# Patient Record
Sex: MALE | Race: WHITE | NOT HISPANIC OR LATINO | Employment: FULL TIME | URBAN - METROPOLITAN AREA
[De-identification: names, ages, dates, MRNs, and addresses within clinical notes are randomized per-mention and may not be internally consistent; named-entity substitution may affect disease eponyms.]

---

## 2023-09-26 ENCOUNTER — HOSPITAL ENCOUNTER (EMERGENCY)
Facility: CLINIC | Age: 38
Discharge: HOME | End: 2023-09-26
Attending: FAMILY MEDICINE | Admitting: FAMILY MEDICINE

## 2023-09-26 ENCOUNTER — APPOINTMENT (OUTPATIENT)
Dept: GENERAL RADIOLOGY | Facility: CLINIC | Age: 38
End: 2023-09-26
Attending: FAMILY MEDICINE

## 2023-09-26 VITALS
SYSTOLIC BLOOD PRESSURE: 144 MMHG | HEIGHT: 66 IN | DIASTOLIC BLOOD PRESSURE: 79 MMHG | RESPIRATION RATE: 20 BRPM | HEART RATE: 62 BPM | BODY MASS INDEX: 43.39 KG/M2 | WEIGHT: 270 LBS | OXYGEN SATURATION: 97 % | TEMPERATURE: 97.8 F

## 2023-09-26 DIAGNOSIS — M79.671 PAIN OF RIGHT HEEL: ICD-10-CM

## 2023-09-26 PROCEDURE — 29515 APPLICATION SHORT LEG SPLINT: CPT | Mod: RT | Performed by: FAMILY MEDICINE

## 2023-09-26 PROCEDURE — 99284 EMERGENCY DEPT VISIT MOD MDM: CPT | Mod: 25 | Performed by: FAMILY MEDICINE

## 2023-09-26 PROCEDURE — 99283 EMERGENCY DEPT VISIT LOW MDM: CPT | Performed by: FAMILY MEDICINE

## 2023-09-26 PROCEDURE — 73650 X-RAY EXAM OF HEEL: CPT | Mod: RT

## 2023-09-26 ASSESSMENT — ACTIVITIES OF DAILY LIVING (ADL): ADLS_ACUITY_SCORE: 35

## 2023-09-26 NOTE — ED TRIAGE NOTES
Right heel pain for past couple weeks, worse when standing/pressure to area. No known injury      Triage Assessment       Row Name 09/26/23 0820       Triage Assessment (Adult)    Airway WDL WDL       Respiratory WDL    Respiratory WDL WDL       Skin Circulation/Temperature WDL    Skin Circulation/Temperature WDL WDL       Cardiac WDL    Cardiac WDL WDL       Peripheral/Neurovascular WDL    Peripheral Neurovascular WDL WDL       Cognitive/Neuro/Behavioral WDL    Cognitive/Neuro/Behavioral WDL WDL

## 2023-09-26 NOTE — LETTER
September 26, 2023      To Whom It May Concern:      Dex Gee was seen in our Emergency Department today, 09/26/23.  I expect his condition to improve over the next 7-14 days.  He may return to work during this time but he is going to have difficulty walking, pushing or pulling anything.    Sincerely,        Keith Hinds MD

## 2023-09-26 NOTE — ED PROVIDER NOTES
"  HPI   Patient is a 38-year-old male presenting with right heel pain.  Symptoms began about a week ago.  He points toward the very back of his heel at the insertion site of his Achilles tendon.  He has difficulty walking because of pain.  No obvious trauma or injury.  He woke up last week and describes getting out of bed and \"feeling something funny at the heel.\"  Pain has been worsened since onset.  No fever or other systemic symptoms.  No recent medication changes.      Allergies:  Allergies   Allergen Reactions    Pcn [Penicillins]      Problem List:    Patient Active Problem List    Diagnosis Date Noted    Hyperlipidemia LDL goal <160 12/28/2010     Priority: Medium      Past Medical History:    No past medical history on file.  Past Surgical History:    No past surgical history on file.  Family History:    Family History   Problem Relation Age of Onset    Depression Father     Thyroid Disease Sister      Social History:  Marital Status:   [2]  Social History     Tobacco Use    Smoking status: Passive Smoke Exposure - Never Smoker   Substance Use Topics    Alcohol use: No    Drug use: No      Medications:    NO ACTIVE MEDICATIONS      Review of Systems   All other systems reviewed and are negative.      PE   BP: (!) 144/79  Pulse: 62  Temp: 97.8  F (36.6  C)  Resp: 20  Height: 167.6 cm (5' 6\")  Weight: 122.5 kg (270 lb)  SpO2: 97 %  Physical Exam  Vitals and nursing note reviewed.   Constitutional:       General: He is not in acute distress.  HENT:      Head: Atraumatic.      Right Ear: External ear normal.      Left Ear: External ear normal.      Nose: Nose normal.      Mouth/Throat:      Mouth: Mucous membranes are moist.      Pharynx: Oropharynx is clear.   Eyes:      General: No scleral icterus.     Extraocular Movements: Extraocular movements intact.      Conjunctiva/sclera: Conjunctivae normal.      Pupils: Pupils are equal, round, and reactive to light.   Cardiovascular:      Rate and Rhythm: " Normal rate.   Pulmonary:      Effort: Pulmonary effort is normal. No respiratory distress.   Musculoskeletal:         General: Normal range of motion.      Cervical back: Normal range of motion.      Comments: Range of motion is intact though he will have right heel pain with full dorsiflexion.  When I squeeze the right calf there is no pain at the heel.  The Achilles seems full and intact but it does become swollen at the distal portion and at the insertion site.  There is point tenderness at the insertion site.  No overlying skin rash.  No evidence of open injury or wound.   Skin:     General: Skin is warm and dry.   Neurological:      Mental Status: He is alert and oriented to person, place, and time.   Psychiatric:         Behavior: Behavior normal.         ED COURSE and [facility]   0836.  Patient has symptoms and signs as described above.  X-ray of the heel pending.    1013.  Are unremarkable for acute pathology.  Calcification seen within the tendon.  When I squeeze the tendon does not elicit severe pain.  When I push on the distal insertion site there is severe pain in swelling.  Distal Achilles tendinopathy versus bursitis.  Walking boot and crutches provided.  Referral order for orthopedics provided.  Ibuprofen and Tylenol.    Electronic medical chart reviewed, including medical problems, medications, medical allergies, social history.  Recent hospitalizations and surgical procedures reviewed.  Recent clinic visits and consultations reviewed.  Recent labs and test results reviewed.  Nursing notes reviewed.    The patient, their parent if applicable, and/or their medical decision maker(s) and I have reviewed all of the available historical information, applicable PMH, physical exam findings, and objective diagnostic data gathered during this ED visit.  We then discussed all work-up options and then together agreed upon the course taken during this visit.  The ultimate disposition and plan was a cooperative  decision made between myself and the patient, their parent if applicable, and/or their legal decision maker(s).  The risks and benefits of all decisions made during this visit were discussed to the best of my abilities given the circumstances, and all parties are understanding of the pertinent ramifications of these decisions.      LABS  Labs Ordered and Resulted from Time of ED Arrival to Time of ED Departure - No data to display    IMAGING  Images reviewed by me.  Radiology report also reviewed.  XR Calcaneus Right G/E 2 Views   Final Result   IMPRESSION:      No evidence of acute fracture. Plantar and retrocalcaneal   enthesophytes.      GAIL SMITH MD            SYSTEM ID:  [id number]          Procedures    Medications - No data to display      IMPRESSION       ICD-10-CM    1. Pain of right heel  M79.671 Orthopedic  Referral     Ankle/Foot Bracing Supplies DME Walking Boot; Right; Non-pneumatic     Crutches Order    Bursitis versus distal Achilles tendinopathy.               Medication List      There are no discharge medications for this visit.                     Keith Hinds MD  09/26/23 1014

## 2023-09-26 NOTE — DISCHARGE INSTRUCTIONS
RETURN TO THE EMERGENCY ROOM FOR THE FOLLOWING:    Severely worsened pain, fever greater than 101, or at anytime for any concern.    FOLLOW UP:    Orthopedic/podiatry referral order placed at the time of discharge.  Expect a phone call within the next few days to help with scheduling.    TREATMENT RECOMMENDATIONS:    Walking boot for comfort during the daytime.  Crutches to be used if the walking boot is not helpful.  Use these only as needed until seen by orthopedics/podiatry.    NURSE ADVICE LINE:  [phone] or [phone]

## 2023-10-02 ENCOUNTER — OFFICE VISIT (OUTPATIENT)
Dept: PODIATRY | Facility: CLINIC | Age: 38
End: 2023-10-02
Attending: FAMILY MEDICINE

## 2023-10-02 VITALS
DIASTOLIC BLOOD PRESSURE: 89 MMHG | SYSTOLIC BLOOD PRESSURE: 139 MMHG | WEIGHT: 270 LBS | HEART RATE: 63 BPM | BODY MASS INDEX: 43.39 KG/M2 | HEIGHT: 66 IN

## 2023-10-02 DIAGNOSIS — M76.61 TENDONITIS, ACHILLES, RIGHT: ICD-10-CM

## 2023-10-02 DIAGNOSIS — M77.31 CALCANEAL SPUR, RIGHT: ICD-10-CM

## 2023-10-02 DIAGNOSIS — M10.071 ACUTE IDIOPATHIC GOUT OF RIGHT FOOT: Primary | ICD-10-CM

## 2023-10-02 DIAGNOSIS — M79.671 PAIN OF RIGHT HEEL: ICD-10-CM

## 2023-10-02 LAB — URATE SERPL-MCNC: 8.7 MG/DL (ref 3.4–7)

## 2023-10-02 PROCEDURE — 99203 OFFICE O/P NEW LOW 30 MIN: CPT | Performed by: PODIATRIST

## 2023-10-02 PROCEDURE — 84550 ASSAY OF BLOOD/URIC ACID: CPT | Performed by: PODIATRIST

## 2023-10-02 PROCEDURE — 36415 COLL VENOUS BLD VENIPUNCTURE: CPT | Performed by: PODIATRIST

## 2023-10-02 RX ORDER — MELOXICAM 7.5 MG/1
7.5 TABLET ORAL DAILY
Qty: 30 TABLET | Refills: 1 | Status: SHIPPED | OUTPATIENT
Start: 2023-10-02

## 2023-10-02 RX ORDER — METHYLPREDNISOLONE 4 MG
4 TABLET, DOSE PACK ORAL SEE ADMIN INSTRUCTIONS
Qty: 21 TABLET | Refills: 0 | Status: SHIPPED | OUTPATIENT
Start: 2023-10-02

## 2023-10-02 ASSESSMENT — PAIN SCALES - GENERAL: PAINLEVEL: SEVERE PAIN (6)

## 2023-10-02 NOTE — NURSING NOTE
"Chief Complaint   Patient presents with    Consult     Right heel pain and swelling       Initial /89   Pulse 63   Ht 1.676 m (5' 6\")   Wt 122.5 kg (270 lb)   BMI 43.58 kg/m   Estimated body mass index is 43.58 kg/m  as calculated from the following:    Height as of this encounter: 1.676 m (5' 6\").    Weight as of this encounter: 122.5 kg (270 lb).  Medications and allergies reviewed.      Geri KUMAR MA    "

## 2023-10-02 NOTE — Clinical Note
10/2/2023         RE: Dex Gee  [address] [facility] [address]        Dear Colleague,    Thank you for referring your patient, Dex Gee, to the [facility] [place]. Please see a copy of my visit note below.    PATIENT HISTORY:  Dex Gee is a 38 year old male who presents to clinic in consultation at the request of  Keith Hinds M.D. with a chief complaint of right heel pain and swelling.  The patient is seen by themselves.  The patient relates the pain is primarily located around the back of the heel and radiates into the top of the foot.  Reports insidious onset without acute precipitating event. that has been going on for 3 week(s).  The patient has previously tried Ibuprofen and Tylenol with little relief.  Denies any prior history of similar issues..  The patient is currently employed as  .  Any previous notes and studies that pertain to the patient's condition were reviewed.    Pertinent medical, surgical and family history was reviewed in the [facility] chart.    Past Medical History: No past medical history on file.    Medications:   Current Outpatient Medications:     NO ACTIVE MEDICATIONS, , Disp: , Rfl: 0     Allergies:    Allergies   Allergen Reactions    Pcn [Penicillins]        Vitals: There were no vitals taken for this visit.  BMI= There is no height or weight on file to calculate BMI.    LOWER EXTREMITY PHYSICAL EXAM    Dermatologic: Skin is intact to {RIGHT /LEFT:476164} lower extremity without significant lesions, rash or abrasion.        Vascular: DP & PT pulses are intact & regular on the {RIGHT /LEFT:186462}.   CFT and skin temperature is normal to the {RIGHT /LEFT:988174} lower extremity.     Neurologic: Lower extremity sensation is intact to light touch.  No evidence of weakness in the {RIGHT /LEFT:805749} lower extremity.        Musculoskeletal: Patient is ambulatory without assistive device or brace.   No gross ankle deformity noted.  No foot or ankle joint effusion is noted.  Noted pain on palpation on the plantar aspect of the {RIGHT /LEFT:541983} heel near the insertion point of the plantar fascia.  No surrounding erythema or edema noted.  Noted tight gastroc complex on the {RIGHT /LEFT:819879}.    Diagnostics:  Radiographs included three views of the {RIGHT LEFT BOTH NO:007507} foot demonstrating *** no cortical erosions or periosteal elevation.  All joint margins appear stable.  There is no apparent fracture or tumor formation noted.  There is no evidence of foreign body.  The images were independently reviewed by myself along with the patient explaining the findings.      ASSESSMENT / PLAN:   No diagnosis found.    I have explained to Dex about the conditions.  We discussed the underlying contributing factors to the condition as well as treatment options along with expected length of recovery.  At this time, the patient was educated on the importance of offloading supportive shoes and other devices.  I demonstrated to the patient calf stretches to perform every hour daily until symptoms resolve.  After symptoms resolve, the patient was advised to perform the stretches 3 times daily to prevent future recurrence.  The patient was instructed to perform warm soaks with Epson salt after which to also apply over-the-counter Voltaren gel to deeply massage the injured tissue.  The patient was instructed to do this on a daily basis until symptoms resolve.  The patient was fitted with a Dynaflex insert that will aid in offloading the tension forces to the soft tissues and prevent further inflammation.  To reduce the amount of current inflammation, the patient was prescribed Mobic 7.5 mg to be taken daily with food and instructed to stop taking if any stomach irritation or swelling in extremities are noted.  The patient may return in four weeks for reevaluation to determine if any further treatment will be  toni.    Dex verbalized agreement with and understanding of the rational for the diagnosis and treatment plan.  All questions were answered to best of my ability and the patient's satisfaction. The patient was advised to contact the clinic with any questions that may arise after the clinic visit.      Disclaimer: This note consists of symbols derived from keyboarding, dictation and/or voice recognition software. As a result, there may be errors in the script that have gone undetected. Please consider this when interpreting information found in this chart.       STACY Reyna.P.KASANDRA., F.A.C.F.A.S.        Again, thank you for allowing me to participate in the care of your patient.        Sincerely,        Toni Astorga DPM

## 2023-10-02 NOTE — PATIENT INSTRUCTIONS

## 2023-10-02 NOTE — PROGRESS NOTES
"PATIENT HISTORY:  Dex Gee is a 38 year old male who presents to clinic in consultation at the request of  Keith Hinds M.D. with a chief complaint of right heel pain and swelling.  The patient is seen by themselves.  The patient relates the pain is primarily located around the back of the heel and radiates into the top of the foot.  Reports insidious onset without acute precipitating event. that has been going on for 3 week(s).  The patient has previously tried Ibuprofen and Tylenol with little relief.  Denies any prior history of similar issues..  The patient is currently employed as  .  Any previous notes and studies that pertain to the patient's condition were reviewed.    Pertinent medical, surgical and family history was reviewed in the Epic chart.    Past Medical History: History reviewed. No pertinent past medical history.    Medications:   Current Outpatient Medications:     meloxicam (MOBIC) 7.5 MG tablet, Take 1 tablet (7.5 mg) by mouth daily, Disp: 30 tablet, Rfl: 1    methylPREDNISolone (MEDROL DOSEPAK) 4 MG tablet therapy pack, Take 1 tablet (4 mg) by mouth See Admin Instructions Tapered dose, Disp: 21 tablet, Rfl: 0    NO ACTIVE MEDICATIONS, , Disp: , Rfl: 0     Allergies:    Allergies   Allergen Reactions    Pcn [Penicillins]        Vitals: /89   Pulse 63   Ht 1.676 m (5' 6\")   Wt 122.5 kg (270 lb)   BMI 43.58 kg/m    BMI= Body mass index is 43.58 kg/m .    LOWER EXTREMITY PHYSICAL EXAM    Dermatologic: Skin is intact to right lower extremity without significant lesions, rash or abrasion.        Vascular: DP & PT pulses are intact & regular on the right.   CFT and skin temperature is normal to the right lower extremity.     Neurologic: Lower extremity sensation is intact to light touch.  No evidence of weakness in the right lower extremity.        Musculoskeletal: Patient is ambulatory without assistive device or brace.  No gross ankle deformity noted.  No " foot or ankle joint effusion is noted.  Noted pain on palpation on the posterior aspect of the right heel near the insertion point of the Achilles tendon.  No surrounding erythema or edema noted.  Noted tight gastroc complex on the right.    Diagnostics: Recent radiographs included calcaneal lateral and axial views of the right foot demonstrating posterior calcaneal spurring with no cortical erosions or periosteal elevation.  All joint margins appear stable.  There is no apparent fracture or tumor formation noted.  There is no evidence of foreign body.  The images were independently reviewed by myself along with the patient explaining the findings.      ASSESSMENT / PLAN:     ICD-10-CM    1. Acute idiopathic gout of right foot  M10.071 Uric acid     meloxicam (MOBIC) 7.5 MG tablet     methylPREDNISolone (MEDROL DOSEPAK) 4 MG tablet therapy pack     Uric acid      2. Pain of right heel  M79.671 Orthopedic  Referral    Bursitis versus distal Achilles tendinopathy.      3. Tendonitis, Achilles, right  M76.61 meloxicam (MOBIC) 7.5 MG tablet     methylPREDNISolone (MEDROL DOSEPAK) 4 MG tablet therapy pack      4. Calcaneal spur, right  M77.31 meloxicam (MOBIC) 7.5 MG tablet     methylPREDNISolone (MEDROL DOSEPAK) 4 MG tablet therapy pack          I have explained to Dex about the conditions.  We discussed the underlying contributing factors to the condition as well as treatment options along with expected length of recovery.  At this time, the patient was educated on the importance of offloading supportive shoes and other devices.  I demonstrated to the patient calf stretches to perform every hour daily until symptoms resolve.  After symptoms resolve, the patient was advised to perform the stretches 3 times daily to prevent future recurrence.  The patient was instructed to perform warm soaks with Epson salt after which to also apply over-the-counter Voltaren gel to deeply massage the injured tissue.  The  patient was instructed to do this on a daily basis until symptoms resolve.  The patient was ordered uric acid labs today.  To reduce the amount of current inflammation, the patient was prescribed a Medrol Dosepak followed by Mobic 7.5 mg to be taken daily with food and instructed to stop taking if any stomach irritation or swelling in extremities are noted.  The patient may return in four weeks for reevaluation to determine if any further treatment will be needed.    Dex verbalized agreement with and understanding of the rational for the diagnosis and treatment plan.  All questions were answered to best of my ability and the patient's satisfaction. The patient was advised to contact the clinic with any questions that may arise after the clinic visit.      Disclaimer: This note consists of symbols derived from keyboarding, dictation and/or voice recognition software. As a result, there may be errors in the script that have gone undetected. Please consider this when interpreting information found in this chart.       ALONZO Astorga D.P.M., F.DAVID.RIDDHI.F.A.S.

## 2024-02-23 ENCOUNTER — APPOINTMENT (OUTPATIENT)
Dept: CT IMAGING | Facility: CLINIC | Age: 39
End: 2024-02-23
Attending: EMERGENCY MEDICINE
Payer: COMMERCIAL

## 2024-02-23 ENCOUNTER — APPOINTMENT (OUTPATIENT)
Dept: MRI IMAGING | Facility: CLINIC | Age: 39
End: 2024-02-23
Attending: EMERGENCY MEDICINE
Payer: COMMERCIAL

## 2024-02-23 ENCOUNTER — HOSPITAL ENCOUNTER (EMERGENCY)
Facility: CLINIC | Age: 39
Discharge: HOME | End: 2024-02-23
Attending: EMERGENCY MEDICINE | Admitting: EMERGENCY MEDICINE
Payer: COMMERCIAL

## 2024-02-23 ENCOUNTER — APPOINTMENT (OUTPATIENT)
Dept: GENERAL RADIOLOGY | Facility: CLINIC | Age: 39
End: 2024-02-23
Attending: EMERGENCY MEDICINE
Payer: COMMERCIAL

## 2024-02-23 VITALS
WEIGHT: 265 LBS | BODY MASS INDEX: 42.59 KG/M2 | HEART RATE: 60 BPM | RESPIRATION RATE: 16 BRPM | TEMPERATURE: 98 F | SYSTOLIC BLOOD PRESSURE: 114 MMHG | DIASTOLIC BLOOD PRESSURE: 72 MMHG | OXYGEN SATURATION: 96 % | HEIGHT: 66 IN

## 2024-02-23 DIAGNOSIS — R42 DIZZINESS: ICD-10-CM

## 2024-02-23 DIAGNOSIS — R07.9 CHEST PAIN, UNSPECIFIED TYPE: ICD-10-CM

## 2024-02-23 LAB
ALBUMIN SERPL BCG-MCNC: 4.3 G/DL (ref 3.5–5.2)
ALP SERPL-CCNC: 85 U/L (ref 40–150)
ALT SERPL W P-5'-P-CCNC: 27 U/L (ref 0–70)
ANION GAP SERPL CALCULATED.3IONS-SCNC: 12 MMOL/L (ref 7–15)
APTT PPP: 30 SECONDS (ref 22–38)
AST SERPL W P-5'-P-CCNC: 21 U/L (ref 0–45)
BASOPHILS # BLD AUTO: 0.1 10E3/UL (ref 0–0.2)
BASOPHILS NFR BLD AUTO: 1 %
BILIRUB DIRECT SERPL-MCNC: <0.2 MG/DL (ref 0–0.3)
BILIRUB SERPL-MCNC: <0.2 MG/DL
BUN SERPL-MCNC: 16.8 MG/DL (ref 6–20)
CALCIUM SERPL-MCNC: 8.9 MG/DL (ref 8.6–10)
CHLORIDE SERPL-SCNC: 104 MMOL/L (ref 98–107)
CREAT SERPL-MCNC: 1.12 MG/DL (ref 0.67–1.17)
DEPRECATED HCO3 PLAS-SCNC: 24 MMOL/L (ref 22–29)
EGFRCR SERPLBLD CKD-EPI 2021: 86 ML/MIN/1.73M2
EOSINOPHIL # BLD AUTO: 0.1 10E3/UL (ref 0–0.7)
EOSINOPHIL NFR BLD AUTO: 2 %
ERYTHROCYTE [DISTWIDTH] IN BLOOD BY AUTOMATED COUNT: 12.4 % (ref 10–15)
GLUCOSE BLDC GLUCOMTR-MCNC: 83 MG/DL (ref 70–99)
GLUCOSE SERPL-MCNC: 88 MG/DL (ref 70–99)
HCT VFR BLD AUTO: 41.8 % (ref 40–53)
HGB BLD-MCNC: 14.5 G/DL (ref 13.3–17.7)
HOLD SPECIMEN: NORMAL
IMM GRANULOCYTES # BLD: 0 10E3/UL
IMM GRANULOCYTES NFR BLD: 0 %
INR PPP: 1.01 (ref 0.85–1.15)
LYMPHOCYTES # BLD AUTO: 2 10E3/UL (ref 0.8–5.3)
LYMPHOCYTES NFR BLD AUTO: 26 %
MCH RBC QN AUTO: 30 PG (ref 26.5–33)
MCHC RBC AUTO-ENTMCNC: 34.7 G/DL (ref 31.5–36.5)
MCV RBC AUTO: 86 FL (ref 78–100)
MONOCYTES # BLD AUTO: 0.6 10E3/UL (ref 0–1.3)
MONOCYTES NFR BLD AUTO: 8 %
NEUTROPHILS # BLD AUTO: 4.9 10E3/UL (ref 1.6–8.3)
NEUTROPHILS NFR BLD AUTO: 63 %
NRBC # BLD AUTO: 0 10E3/UL
NRBC BLD AUTO-RTO: 0 /100
NT-PROBNP SERPL-MCNC: 39 PG/ML (ref 0–450)
PLATELET # BLD AUTO: 217 10E3/UL (ref 150–450)
POTASSIUM SERPL-SCNC: 4.2 MMOL/L (ref 3.4–5.3)
PROT SERPL-MCNC: 7.4 G/DL (ref 6.4–8.3)
RBC # BLD AUTO: 4.84 10E6/UL (ref 4.4–5.9)
SODIUM SERPL-SCNC: 140 MMOL/L (ref 135–145)
TROPONIN T SERPL HS-MCNC: <6 NG/L
WBC # BLD AUTO: 7.6 10E3/UL (ref 4–11)

## 2024-02-23 PROCEDURE — 83880 ASSAY OF NATRIURETIC PEPTIDE: CPT | Performed by: EMERGENCY MEDICINE

## 2024-02-23 PROCEDURE — 250N000011 HC RX IP 250 OP 636: Performed by: EMERGENCY MEDICINE

## 2024-02-23 PROCEDURE — A9585 GADOBUTROL INJECTION: HCPCS | Performed by: EMERGENCY MEDICINE

## 2024-02-23 PROCEDURE — 99291 CRITICAL CARE FIRST HOUR: CPT | Mod: 25 | Performed by: EMERGENCY MEDICINE

## 2024-02-23 PROCEDURE — 85610 PROTHROMBIN TIME: CPT | Performed by: EMERGENCY MEDICINE

## 2024-02-23 PROCEDURE — 71046 X-RAY EXAM CHEST 2 VIEWS: CPT

## 2024-02-23 PROCEDURE — 70553 MRI BRAIN STEM W/O & W/DYE: CPT

## 2024-02-23 PROCEDURE — 85730 THROMBOPLASTIN TIME PARTIAL: CPT | Performed by: EMERGENCY MEDICINE

## 2024-02-23 PROCEDURE — 70450 CT HEAD/BRAIN W/O DYE: CPT

## 2024-02-23 PROCEDURE — 82962 GLUCOSE BLOOD TEST: CPT

## 2024-02-23 PROCEDURE — 93005 ELECTROCARDIOGRAM TRACING: CPT | Performed by: EMERGENCY MEDICINE

## 2024-02-23 PROCEDURE — 255N000002 HC RX 255 OP 636: Performed by: EMERGENCY MEDICINE

## 2024-02-23 PROCEDURE — 250N000009 HC RX 250: Performed by: EMERGENCY MEDICINE

## 2024-02-23 PROCEDURE — 70496 CT ANGIOGRAPHY HEAD: CPT

## 2024-02-23 PROCEDURE — 93010 ELECTROCARDIOGRAM REPORT: CPT | Performed by: EMERGENCY MEDICINE

## 2024-02-23 PROCEDURE — 36415 COLL VENOUS BLD VENIPUNCTURE: CPT | Performed by: EMERGENCY MEDICINE

## 2024-02-23 PROCEDURE — 96375 TX/PRO/DX INJ NEW DRUG ADDON: CPT | Performed by: EMERGENCY MEDICINE

## 2024-02-23 PROCEDURE — 0042T CT HEAD PERFUSION W CONTRAST: CPT

## 2024-02-23 PROCEDURE — 96374 THER/PROPH/DIAG INJ IV PUSH: CPT | Mod: 59 | Performed by: EMERGENCY MEDICINE

## 2024-02-23 PROCEDURE — 85025 COMPLETE CBC W/AUTO DIFF WBC: CPT | Performed by: EMERGENCY MEDICINE

## 2024-02-23 PROCEDURE — 84484 ASSAY OF TROPONIN QUANT: CPT | Performed by: EMERGENCY MEDICINE

## 2024-02-23 PROCEDURE — 80048 BASIC METABOLIC PNL TOTAL CA: CPT | Performed by: EMERGENCY MEDICINE

## 2024-02-23 PROCEDURE — 80076 HEPATIC FUNCTION PANEL: CPT | Performed by: EMERGENCY MEDICINE

## 2024-02-23 RX ORDER — IOPAMIDOL 755 MG/ML
167 INJECTION, SOLUTION INTRAVASCULAR ONCE
Status: COMPLETED | OUTPATIENT
Start: 2024-02-23 | End: 2024-02-23

## 2024-02-23 RX ORDER — GADOBUTROL 604.72 MG/ML
12 INJECTION INTRAVENOUS ONCE
Status: COMPLETED | OUTPATIENT
Start: 2024-02-23 | End: 2024-02-23

## 2024-02-23 RX ORDER — ONDANSETRON 2 MG/ML
4 INJECTION INTRAMUSCULAR; INTRAVENOUS ONCE
Status: COMPLETED | OUTPATIENT
Start: 2024-02-23 | End: 2024-02-23

## 2024-02-23 RX ORDER — LORAZEPAM 2 MG/ML
0.5 INJECTION INTRAMUSCULAR ONCE
Status: COMPLETED | OUTPATIENT
Start: 2024-02-23 | End: 2024-02-23

## 2024-02-23 RX ADMIN — GADOBUTROL 12 ML: 604.72 INJECTION INTRAVENOUS at 18:15

## 2024-02-23 RX ADMIN — SODIUM CHLORIDE 200 ML: 9 INJECTION, SOLUTION INTRAVENOUS at 17:01

## 2024-02-23 RX ADMIN — LORAZEPAM 0.5 MG: 2 INJECTION INTRAMUSCULAR; INTRAVENOUS at 18:06

## 2024-02-23 RX ADMIN — ONDANSETRON 4 MG: 2 INJECTION INTRAMUSCULAR; INTRAVENOUS at 17:26

## 2024-02-23 RX ADMIN — IOPAMIDOL 167 ML: 755 INJECTION, SOLUTION INTRAVENOUS at 17:01

## 2024-02-23 ASSESSMENT — ACTIVITIES OF DAILY LIVING (ADL)
ADLS_ACUITY_SCORE: 35

## 2024-02-23 ASSESSMENT — COLUMBIA-SUICIDE SEVERITY RATING SCALE - C-SSRS
2. HAVE YOU ACTUALLY HAD ANY THOUGHTS OF KILLING YOURSELF IN THE PAST MONTH?: NO
1. IN THE PAST MONTH, HAVE YOU WISHED YOU WERE DEAD OR WISHED YOU COULD GO TO SLEEP AND NOT WAKE UP?: NO
6. HAVE YOU EVER DONE ANYTHING, STARTED TO DO ANYTHING, OR PREPARED TO DO ANYTHING TO END YOUR LIFE?: NO

## 2024-02-23 ASSESSMENT — VISUAL ACUITY
OU: BASELINE
OU: BASELINE

## 2024-02-23 NOTE — ED NOTES
38-year-old male presents urgent care initially with concern over dizzy, disoriented, difficulty word finding and short of breath developed earlier this afternoon.  I discussed with patient typical scope evaluation available in the urgent care and given broad differential for symptoms recommend evaluation in the emergency department.  Patient was amenable to this plan.  He was transferred to the ED triage nurse.       Magui Corbett PA-C  02/23/24 0584

## 2024-02-23 NOTE — CONSULTS
[facility]    Stroke Telephone Note    I was called by Abilio Flores on 02/23/24 regarding patient Dex Gee. The patient is a 38 year old male HLD who presents to the ED after he had 2 episodes of dizziness and imbalance or difficulty with getting the words out and feeling confused.  Symptoms started at 10 AM when he had dizziness and imbalance.  His first episode lasted 15 minutes and the second episode lasted 45 minutes around noon time.  Is currently in the ED and states that he feels off now.  No objective neuro exam deficits as per ED.      Vitals  BP: (!) 141/90   Pulse: 53   Resp: 17       Weight: 120.2 kg (265 lb)    Stroke Code Data (for stroke code without tele)  Stroke code activated 02/23/24  1651   Stroke provider first response 02/23/24  1653   Last known normal 02/23/24  1000      Time of discovery (or onset of symptoms) 02/23/24  1000   Head CT read by Stroke Neuro Provider 02/23/24  1714   Was stroke code de-escalated? Yes  02/23/24  1730     Imaging Findings  CT head: No hemorrhage  CTA head/neck: No LVO    Intravenous Thrombolysis  Not given due to:   - no hemorrhage    Endovascular Treatment  Not initiated due to absence of proximal vessel occlusion    Impression  Patient had 2 episodes of dizziness, imbalance and possible confusion and his imaging is not showing signs of ischemia or hemorrhage.  Less likely to be acute ischemic infarct    Recommendations   -MRI brain with and without contrast.  Please page on-call stroke neurology of once the MRI is obtained.    My recommendations are based on the information provided over the phone by Dex Gee's in-person providers. They are not intended to replace the clinical judgment of his in-person providers. I was not requested to personally see or examine the patient at this time.     The Stroke Staff is Dr. Hi.    Klarissa Tomas MD  Vascular Neurology Fellow    To page me or covering stroke  "neurology team member, click here: [facility]  Choose \"On Call\" tab at top, then select \"NEUROLOGY/ALL SITES\" from middle drop-down box, press Enter, then look for \"stroke\" or \"telestroke\" for your site.   "

## 2024-02-23 NOTE — ED TRIAGE NOTES
"Patient reports ~ 1000 this morning while at work patient started to developed chest pain, dyspnea and dizziness. Patient attempted to rest to alleviate symptoms. No change. Reports \"disorientation\". Could not remember tasks he was doing. No cardiac hx or stroke hx.      Triage Assessment (Adult)       Row Name 02/23/24 5853          Triage Assessment    Airway WDL WDL        Respiratory WDL    Respiratory WDL X;rhythm/pattern     Rhythm/Pattern, Respiratory shortness of breath        Skin Circulation/Temperature WDL    Skin Circulation/Temperature WDL WDL        Cardiac WDL    Cardiac WDL X;chest pain;rhythm     Pulse Rate & Regularity bradycardic     Cardiac Rhythm NSR        Chest Pain Assessment    Chest Pain Location anterior chest, left     Chest Pain Radiation back     Character pressure     Duration ~1000     Precipitating Factors nothing     Alleviating Factors rest     Associated Signs/Symptoms dizziness;dyspnea     Chest Pain Intervention 12-lead ECG obtained;cardiac biomarkers drawn;cardiac monitoring continued;cardiac monitor placed        Peripheral/Neurovascular WDL    Peripheral Neurovascular WDL WDL        Cognitive/Neuro/Behavioral WDL    Cognitive/Neuro/Behavioral WDL X     Level of Consciousness alert     Arousal Level opens eyes spontaneously     Orientation oriented x 4     Speech clear;spontaneous;logical     Mood/Behavior calm;cooperative        Pupils (CN II)    Pupil PERRLA yes     Pupil Size Left 5 mm     Pupil Size Right 5 mm        [place] Coma Scale    Best Eye Response 4-->(E4) spontaneous     Best Motor Response 6-->(M6) obeys commands     Best Verbal Response 5-->(V5) oriented     [place] Coma Scale Score 15                     "

## 2024-02-23 NOTE — ED PROVIDER NOTES
History     Chief Complaint   Patient presents with    Chest Pain    Dizziness     HPI  Dex Gee is a 38 year old male with no significant past medical history other than hyperlipidemia who presents emergency department complaining of dizziness feeling off and and having trouble concentrating difficulty with reading text and felt like he was having trouble finding words.  This lasted about 15 minutes he was feeling better but reoccurred around noon with similar symptoms.  States this lasted about 45 minutes and shortly after this when he went home had some chest pressure and felt a bit short of breath.  States he had difficulty lying down.  Family decided bring him in for further evaluation and care denies any headache at this time has not had any visual changes is having some mild chest pressure denies any focal numbness weakness in extremity has not any bowel or bladder dysfunction.  Denies any leg swelling or calf pain.    Allergies:  Allergies   Allergen Reactions    Shellfish Allergy Itching and Shortness Of Breath    Pcn [Penicillins]     Sulfa Antibiotics Unknown     Patient reported on 8-18-18  his throat swells up when fireworks are being used. He doesn't recall being allergic to sulfa antibiotics, just sulfur from fireworks.       Problem List:    Patient Active Problem List    Diagnosis Date Noted    Hyperlipidemia LDL goal <160 12/28/2010     Priority: Medium        Past Medical History:    No past medical history on file.    Past Surgical History:    No past surgical history on file.    Family History:    Family History   Problem Relation Age of Onset    Depression Father     Thyroid Disease Sister        Social History:  Marital Status:   [2]  Social History     Tobacco Use    Smoking status: Never     Passive exposure: Yes    Smokeless tobacco: Current     Types: Chew   Substance Use Topics    Alcohol use: No    Drug use: No        Medications:    meloxicam (MOBIC) 7.5 MG  "tablet  methylPREDNISolone (MEDROL DOSEPAK) 4 MG tablet therapy pack  NO ACTIVE MEDICATIONS          Review of Systems  As per HPI.  Physical Exam   BP: (!) 129/92  Pulse: 65  Resp: 17  Height: 167.6 cm (5' 6\")  Weight: 120.2 kg (265 lb)  SpO2: 99 %      Physical Exam  Vitals and nursing note reviewed.   Constitutional:       Appearance: Normal appearance.      Comments: Mild generalized distress   HENT:      Head: Normocephalic and atraumatic.      Nose: Nose normal.      Mouth/Throat:      Mouth: Mucous membranes are moist.      Pharynx: Oropharynx is clear.   Eyes:      Extraocular Movements: Extraocular movements intact.      Conjunctiva/sclera: Conjunctivae normal.      Pupils: Pupils are equal, round, and reactive to light.      Comments: No visual field deficits are noted.   Cardiovascular:      Rate and Rhythm: Normal rate and regular rhythm.      Pulses: Normal pulses.      Heart sounds: Normal heart sounds. No murmur heard.  Pulmonary:      Effort: Pulmonary effort is normal.      Breath sounds: Normal breath sounds. No stridor. No wheezing or rhonchi.   Abdominal:      General: Abdomen is flat. Bowel sounds are normal. There is no distension.      Palpations: Abdomen is soft.      Tenderness: There is no abdominal tenderness. There is no right CVA tenderness or left CVA tenderness.   Musculoskeletal:         General: Normal range of motion.      Cervical back: Normal range of motion and neck supple.      Right lower leg: No edema.      Left lower leg: No edema.   Skin:     General: Skin is warm and dry.      Capillary Refill: Capillary refill takes less than 2 seconds.      Findings: No rash.   Neurological:      General: No focal deficit present.      Mental Status: He is alert and oriented to person, place, and time.      Cranial Nerves: No cranial nerve deficit.      Sensory: No sensory deficit.      Motor: No weakness.      Coordination: Coordination normal.      Comments: There is no drift in upper " or lower extremities.   Psychiatric:         Mood and Affect: Mood normal.       ED Course                Procedures              EKG Interpretation:      Interpreted by Abilio Flores MD  Rhythm: sinus bradycardia  Rate: 50-60  Axis: Normal  Ectopy: none  Conduction: normal  ST Segments/ T Waves: Non-specific ST-T wave changes  Q Waves: none  Comparison to prior: No old EKG available    Clinical Impression: sinus bradycardia            Critical Care time:  was 30 minutes for this patient excluding procedures.  For management of tier 2 stroke code.     The patient has stroke symptoms:         ED Stroke specific documentation           NIHSS PDF     Patient last known well time: 10a  ED Provider first to bedside at: Upon arrival  CT Results received at: 535p    Thrombolytics:   Not given due to:   - minor/isolated/quickly resolving symptoms    If treating with thrombolytics: Ensure SBP<180 and DBP<105 prior to treatment with thrombolytics.  Administering thrombolytics after treatment with IV labetalol, hydralazine, or nicardipine is reasonable once BP control is established.    Endovascular Retrieval:  Not initiated due to absence of proximal vessel occlusion    National Institutes of Health Stroke Scale (Baseline)  Time Performed:      Score    Level of consciousness: (0)   Alert, keenly responsive    LOC questions: (0)   Answers both questions correctly    LOC commands: (0)   Performs both tasks correctly    Best gaze: (0)   Normal    Visual: (0)   No visual loss    Facial palsy: (0)   Normal symmetrical movements    Motor arm (left): (0)   No drift    Motor arm (right): (0)   No drift    Motor leg (left): (0)   No drift    Motor leg (right): (0)   No drift    Limb ataxia: (0)   Absent    Sensory: (0)   Normal- no sensory loss    Best language: (0)   Normal- no aphasia    Dysarthria: (0)   Normal    Extinction and inattention: (0)   No abnormality        Total Score:  0        Stroke Mimics were considered  (including migraine headache, seizure disorder, hypoglycemia (or hyperglycemia), head or spinal trauma, CNS infection, Toxin ingestion and shock state (e.g. sepsis) .            National Institutes of Health Stroke Scale  Time Performed: 1930  Total Score: 0(unchanged from last stroke score)           Labs Ordered and Resulted from Time of ED Arrival to Time of ED Departure   BASIC METABOLIC PANEL - Normal       Result Value    Sodium 140      Potassium 4.2      Chloride 104      Carbon Dioxide (CO2) 24      Anion Gap 12      Urea Nitrogen 16.8      Creatinine 1.12      GFR Estimate 86      Calcium 8.9      Glucose 88     INR - Normal    INR 1.01     NT PROBNP INPATIENT - Normal    N terminal Pro BNP Inpatient 39     PARTIAL THROMBOPLASTIN TIME - Normal    aPTT 30     TROPONIN T, HIGH SENSITIVITY - Normal    Troponin T, High Sensitivity <6     GLUCOSE BY METER - Normal    GLUCOSE BY METER POCT 83     HEPATIC FUNCTION PANEL - Normal    Protein Total 7.4      Albumin 4.3      Bilirubin Total <0.2      Alkaline Phosphatase 85      AST 21      ALT 27      Bilirubin Direct <0.20     CBC WITH PLATELETS AND DIFFERENTIAL    WBC Count 7.6      RBC Count 4.84      Hemoglobin 14.5      Hematocrit 41.8      MCV 86      MCH 30.0      MCHC 34.7      RDW 12.4      Platelet Count 217      % Neutrophils 63      % Lymphocytes 26      % Monocytes 8      % Eosinophils 2      % Basophils 1      % Immature Granulocytes 0      NRBCs per 100 WBC 0      Absolute Neutrophils 4.9      Absolute Lymphocytes 2.0      Absolute Monocytes 0.6      Absolute Eosinophils 0.1      Absolute Basophils 0.1      Absolute Immature Granulocytes 0.0      Absolute NRBCs 0.0        Results for orders placed or performed during the hospital encounter of 02/23/24   CT Head w/o Contrast    Narrative    EXAM: CTA HEAD NECK W CONTRAST, CT HEAD W/O CONTRAST, CT HEAD PERFUSION W CONTRAST  LOCATION: [facility]  DATE: 2/23/2024    INDICATION:  Dyspnea, dizziness, disorientation. Code Stroke to evaluate for potential thrombolysis and thrombectomy. PLEASE READ IMMEDIATELY.  COMPARISON: None.  TECHNIQUE: Head and neck CT angiogram with IV contrast. Noncontrast head CT followed by axial helical CT images of the head and neck vessels obtained during the arterial phase of intravenous contrast administration. Axial 2D reconstructed images and   multiplanar 3D MIP reconstructed images of the head and neck vessels were performed by the technologist. Additional CT cerebral perfusion was performed utilizing a second contrast bolus. Perfusion data were post processed with generation of standard   perfusion maps and estimation of ischemic/infarcted volumes utilizing standard threshold values. Dose reduction techniques were used. All stenosis measurements made according to NASCET criteria unless otherwise specified.  CONTRAST: 67mL Isovue 370 (accession [id number]), 67mL Isovue 370 (accession [id number]), 100 mL Isovue 370 (accession [id number])    FINDINGS:   NONCONTRAST HEAD CT:   INTRACRANIAL CONTENTS: No intracranial hemorrhage, extraaxial collection, or mass effect.  No CT evidence of acute infarct. Normal parenchymal attenuation. Normal ventricles and sulci.     VISUALIZED ORBITS/SINUSES/MASTOIDS: No intraorbital abnormality. No paranasal sinus mucosal disease. No middle ear or mastoid effusion.    BONES/SOFT TISSUES: No acute abnormality.    HEAD CTA:  ANTERIOR CIRCULATION: No stenosis/occlusion, aneurysm, or high flow vascular malformation. Fetal origin of both posterior cerebral arteries from the anterior circulation.    POSTERIOR CIRCULATION: No stenosis/occlusion, aneurysm, or high flow vascular malformation. Balanced vertebral arteries supply a normal basilar artery.     DURAL VENOUS SINUSES: Expected enhancement of the major dural venous sinuses.    NECK CTA:  RIGHT CAROTID: No measurable stenosis or dissection.    LEFT CAROTID: No measurable stenosis or  dissection.    VERTEBRAL ARTERIES: No focal stenosis or dissection. Balanced vertebral arteries.    AORTIC ARCH: Classic aortic arch anatomy with no significant stenosis at the origin of the great vessels.    NONVASCULAR STRUCTURES: Unremarkable.    CT PERFUSION:  PERFUSION MAPS: Symmetrical cerebral perfusion. No focal deficits in cerebral blood flow or volume to suggest ischemia/oligemia.    RAPID ANALYSIS:  CBF<30%: 0  Tmax>6sec: 0  Mismatch volume: 0  Mismatch ratio: None      Impression    IMPRESSION:   HEAD CT:  1.  Normal head CT.    HEAD CTA:   1.  Normal CTA [place].    NECK CTA:  1.  Normal neck CTA.    CT PERFUSION:  1.  Normal cerebral perfusion.    2.  Dr. Pham discussed the above findings by telephone with Dr. lFores at 5:37 PM 02/23/2024.   CTA Head Neck with Contrast    Narrative    EXAM: CTA HEAD NECK W CONTRAST, CT HEAD W/O CONTRAST, CT HEAD PERFUSION W CONTRAST  LOCATION: [facility]  DATE: 2/23/2024    INDICATION: Dyspnea, dizziness, disorientation. Code Stroke to evaluate for potential thrombolysis and thrombectomy. PLEASE READ IMMEDIATELY.  COMPARISON: None.  TECHNIQUE: Head and neck CT angiogram with IV contrast. Noncontrast head CT followed by axial helical CT images of the head and neck vessels obtained during the arterial phase of intravenous contrast administration. Axial 2D reconstructed images and   multiplanar 3D MIP reconstructed images of the head and neck vessels were performed by the technologist. Additional CT cerebral perfusion was performed utilizing a second contrast bolus. Perfusion data were post processed with generation of standard   perfusion maps and estimation of ischemic/infarcted volumes utilizing standard threshold values. Dose reduction techniques were used. All stenosis measurements made according to NASCET criteria unless otherwise specified.  CONTRAST: 67mL Isovue 370 (accession [id number]), 67mL Isovue 370 (accession  [id number]), 100 mL Isovue 370 (accession [id number])    FINDINGS:   NONCONTRAST HEAD CT:   INTRACRANIAL CONTENTS: No intracranial hemorrhage, extraaxial collection, or mass effect.  No CT evidence of acute infarct. Normal parenchymal attenuation. Normal ventricles and sulci.     VISUALIZED ORBITS/SINUSES/MASTOIDS: No intraorbital abnormality. No paranasal sinus mucosal disease. No middle ear or mastoid effusion.    BONES/SOFT TISSUES: No acute abnormality.    HEAD CTA:  ANTERIOR CIRCULATION: No stenosis/occlusion, aneurysm, or high flow vascular malformation. Fetal origin of both posterior cerebral arteries from the anterior circulation.    POSTERIOR CIRCULATION: No stenosis/occlusion, aneurysm, or high flow vascular malformation. Balanced vertebral arteries supply a normal basilar artery.     DURAL VENOUS SINUSES: Expected enhancement of the major dural venous sinuses.    NECK CTA:  RIGHT CAROTID: No measurable stenosis or dissection.    LEFT CAROTID: No measurable stenosis or dissection.    VERTEBRAL ARTERIES: No focal stenosis or dissection. Balanced vertebral arteries.    AORTIC ARCH: Classic aortic arch anatomy with no significant stenosis at the origin of the great vessels.    NONVASCULAR STRUCTURES: Unremarkable.    CT PERFUSION:  PERFUSION MAPS: Symmetrical cerebral perfusion. No focal deficits in cerebral blood flow or volume to suggest ischemia/oligemia.    RAPID ANALYSIS:  CBF<30%: 0  Tmax>6sec: 0  Mismatch volume: 0  Mismatch ratio: None      Impression    IMPRESSION:   HEAD CT:  1.  Normal head CT.    HEAD CTA:   1.  Normal CTA [place].    NECK CTA:  1.  Normal neck CTA.    CT PERFUSION:  1.  Normal cerebral perfusion.    2.  Dr. Pham discussed the above findings by telephone with Dr. Flores at 5:37 PM 02/23/2024.   CT Head Perfusion w Contrast - For Tier 2 Stroke    Narrative    EXAM: CTA HEAD NECK W CONTRAST, CT HEAD W/O CONTRAST, CT HEAD PERFUSION W CONTRAST  LOCATION: [facility]  DATE: 2/23/2024    INDICATION: Dyspnea, dizziness, disorientation. Code Stroke to evaluate for potential thrombolysis and thrombectomy. PLEASE READ IMMEDIATELY.  COMPARISON: None.  TECHNIQUE: Head and neck CT angiogram with IV contrast. Noncontrast head CT followed by axial helical CT images of the head and neck vessels obtained during the arterial phase of intravenous contrast administration. Axial 2D reconstructed images and   multiplanar 3D MIP reconstructed images of the head and neck vessels were performed by the technologist. Additional CT cerebral perfusion was performed utilizing a second contrast bolus. Perfusion data were post processed with generation of standard   perfusion maps and estimation of ischemic/infarcted volumes utilizing standard threshold values. Dose reduction techniques were used. All stenosis measurements made according to NASCET criteria unless otherwise specified.  CONTRAST: 67mL Isovue 370 (accession [id number]), 67mL Isovue 370 (accession [id number]), 100 mL Isovue 370 (accession [id number])    FINDINGS:   NONCONTRAST HEAD CT:   INTRACRANIAL CONTENTS: No intracranial hemorrhage, extraaxial collection, or mass effect.  No CT evidence of acute infarct. Normal parenchymal attenuation. Normal ventricles and sulci.     VISUALIZED ORBITS/SINUSES/MASTOIDS: No intraorbital abnormality. No paranasal sinus mucosal disease. No middle ear or mastoid effusion.    BONES/SOFT TISSUES: No acute abnormality.    HEAD CTA:  ANTERIOR CIRCULATION: No stenosis/occlusion, aneurysm, or high flow vascular malformation. Fetal origin of both posterior cerebral arteries from the anterior circulation.    POSTERIOR CIRCULATION: No stenosis/occlusion, aneurysm, or high flow vascular malformation. Balanced vertebral arteries supply a normal basilar artery.     DURAL VENOUS SINUSES: Expected enhancement of the major dural venous sinuses.    NECK CTA:  RIGHT CAROTID: No measurable stenosis or  dissection.    LEFT CAROTID: No measurable stenosis or dissection.    VERTEBRAL ARTERIES: No focal stenosis or dissection. Balanced vertebral arteries.    AORTIC ARCH: Classic aortic arch anatomy with no significant stenosis at the origin of the great vessels.    NONVASCULAR STRUCTURES: Unremarkable.    CT PERFUSION:  PERFUSION MAPS: Symmetrical cerebral perfusion. No focal deficits in cerebral blood flow or volume to suggest ischemia/oligemia.    RAPID ANALYSIS:  CBF<30%: 0  Tmax>6sec: 0  Mismatch volume: 0  Mismatch ratio: None      Impression    IMPRESSION:   HEAD CT:  1.  Normal head CT.    HEAD CTA:   1.  Normal CTA [place].    NECK CTA:  1.  Normal neck CTA.    CT PERFUSION:  1.  Normal cerebral perfusion.    2.  Dr. Pham discussed the above findings by telephone with Dr. Flores at 5:37 PM 02/23/2024.   MR Brain w/o & w Contrast    Narrative    EXAM: MR BRAIN W/O and W CONTRAST  LOCATION: [facility]  DATE: 2/23/2024    INDICATION: Dizziness with confusion and some speech difficulty  COMPARISON: CTA head and neck 02/23/2024  CONTRAST: gadavist 12ml  TECHNIQUE: Routine multiplanar multisequence head MRI without and with intravenous contrast.    FINDINGS:  INTRACRANIAL CONTENTS: No acute or subacute infarct. No mass, acute hemorrhage, or extra-axial fluid collections. Normal brain parenchymal signal. Normal ventricles and sulci. Normal position of the cerebellar tonsils. No pathologic contrast enhancement.    SELLA: No abnormality accounting for technique.    OSSEOUS STRUCTURES/SOFT TISSUES: Normal marrow signal. The major intracranial vascular flow voids are maintained.     ORBITS: No abnormality accounting for technique.     SINUSES/MASTOIDS: No paranasal sinus mucosal disease. No middle ear or mastoid effusion.       Impression    IMPRESSION:  1.  Normal head MRI.   Chest XR,  PA & LAT    Narrative    EXAM: XR CHEST 2 VIEWS  LOCATION: [facility]  CENTER  DATE: 2/23/2024    INDICATION: Chest pain.  COMPARISON: None available.      Impression    IMPRESSION: No focal airspace consolidation. No pleural effusion or pneumothorax.    Upper limits of normal heart size.        Medications   iopamidol (ISOVUE-370) solution 167 mL (has no administration in time range)   sodium chloride 0.9 % bag 500mL for CT scan flush use (has no administration in time range)       Assessments & Plan (with Medical Decision Making) records were reviewed.  Past medical history medications and allergies were reviewed.  EKG revealed a sinus bradycardia with nonspecific ST-T wave changes.  There is no old EKG for comparison.  Due to patient's exam and presentation a tier 2 stroke code was called.  I discussed the case with Dr. Tomas with stroke neurology.  Patient was sent to CT scan for imaging studies.  Labs were obtained.  CT scan of the head CTA of the head and neck were reviewed independently reviewed by myself and agree with radiologist findings of no obvious acute abnormality.  Again discussed case with stroke neurology and a MRI scan was reviewed.  I independently reviewed and interpreted labs.  Patient CBC was unremarkable.  Comprehensive metabolic panel without significant abnormality.  Troponin was less than 6 proBNP 39.  PT and PTT within normal limits.  Patient has been observed for several hours and had no further events he is able to ambulate without difficulty.  MRI of the brain is unremarkable.  I again discussed case with stroke neurology and they felt that stroke symptoms were unlikely and patient needed no further workup follow-up with primary care is warranted.  Findings and plan discussed with patient throughout the stay and he feels comfortable going home at this time.  He should follow-up with his primary care doctor for further workup of chest pain and possible stress test set up.  I do not think this is ACS at this time.  If any further dizziness off balance  weakness or other symptoms present patient should be return for recheck for further evaluation and care.  He is agree with this plan.     I have reviewed the nursing notes.    I have reviewed the findings, diagnosis, plan and need for follow up with the patient.           New Prescriptions    No medications on file       Final diagnoses:   Dizziness   Chest pain, unspecified type       2/23/2024   [facility] EMERGENCY DEPT       Abilio Flores MD  02/25/24 [id number]

## 2024-02-24 NOTE — ED NOTES
Pt states he is still dizzy off and on, denies any further chest pain, all test resulted, VSS, SO @ bedside, updated on POC, anticipate discharge.

## 2024-02-28 ENCOUNTER — HOSPITAL ENCOUNTER (EMERGENCY)
Facility: CLINIC | Age: 39
Discharge: HOME | End: 2024-02-28
Attending: FAMILY MEDICINE | Admitting: FAMILY MEDICINE
Payer: COMMERCIAL

## 2024-02-28 VITALS
SYSTOLIC BLOOD PRESSURE: 124 MMHG | OXYGEN SATURATION: 94 % | DIASTOLIC BLOOD PRESSURE: 83 MMHG | RESPIRATION RATE: 16 BRPM | BODY MASS INDEX: 41.78 KG/M2 | WEIGHT: 260 LBS | TEMPERATURE: 97.9 F | HEART RATE: 55 BPM | HEIGHT: 66 IN

## 2024-02-28 DIAGNOSIS — R42 DIZZINESS: ICD-10-CM

## 2024-02-28 DIAGNOSIS — R07.89 CHEST PRESSURE: ICD-10-CM

## 2024-02-28 LAB
D DIMER PPP FEU-MCNC: <0.27 UG/ML FEU (ref 0–0.5)
HOLD SPECIMEN: NORMAL
TROPONIN T SERPL HS-MCNC: <6 NG/L
TSH SERPL DL<=0.005 MIU/L-ACNC: 2.43 UIU/ML (ref 0.3–4.2)

## 2024-02-28 PROCEDURE — 84443 ASSAY THYROID STIM HORMONE: CPT | Performed by: FAMILY MEDICINE

## 2024-02-28 PROCEDURE — 99284 EMERGENCY DEPT VISIT MOD MDM: CPT | Mod: 25 | Performed by: FAMILY MEDICINE

## 2024-02-28 PROCEDURE — 84484 ASSAY OF TROPONIN QUANT: CPT | Performed by: FAMILY MEDICINE

## 2024-02-28 PROCEDURE — 93010 ELECTROCARDIOGRAM REPORT: CPT | Performed by: FAMILY MEDICINE

## 2024-02-28 PROCEDURE — 85379 FIBRIN DEGRADATION QUANT: CPT | Performed by: FAMILY MEDICINE

## 2024-02-28 PROCEDURE — 36415 COLL VENOUS BLD VENIPUNCTURE: CPT | Performed by: FAMILY MEDICINE

## 2024-02-28 PROCEDURE — 93005 ELECTROCARDIOGRAM TRACING: CPT

## 2024-02-28 PROCEDURE — 99284 EMERGENCY DEPT VISIT MOD MDM: CPT

## 2024-02-28 ASSESSMENT — ACTIVITIES OF DAILY LIVING (ADL)
ADLS_ACUITY_SCORE: 35
ADLS_ACUITY_SCORE: 35

## 2024-02-28 ASSESSMENT — COLUMBIA-SUICIDE SEVERITY RATING SCALE - C-SSRS
1. IN THE PAST MONTH, HAVE YOU WISHED YOU WERE DEAD OR WISHED YOU COULD GO TO SLEEP AND NOT WAKE UP?: NO
6. HAVE YOU EVER DONE ANYTHING, STARTED TO DO ANYTHING, OR PREPARED TO DO ANYTHING TO END YOUR LIFE?: NO
2. HAVE YOU ACTUALLY HAD ANY THOUGHTS OF KILLING YOURSELF IN THE PAST MONTH?: NO

## 2024-02-28 NOTE — DISCHARGE INSTRUCTIONS
I suspect your symptoms are due to sleep apnea.  There may be a component of anxiety.  Talk with your primary physician tomorrow about getting a sleep study done.  We have good evidence that this is not due to heart attack, heart failure, stroke, absent lung, clot in your lung, or other worrisome cause based on your testing today and last Friday.

## 2024-02-28 NOTE — ED PROVIDER NOTES
"  History     Chief Complaint   Patient presents with    Chest Pain    Dizziness     HPI  Dex Gee is a 38 year old male who comes in with intermittent episodes of chest tightness and lightheadedness feeling like he is going to faint; he had this intermittently in the past but the symptoms became more severe on Friday when he sought care here in the emergency department.  He underwent cardiac and neurologic assessment including assessment for stroke with CT scan of the brain and CT angio of the brain and neck CT perfusion study and MRI imaging of the brain, all of which were normal.  He had CBC, blood chemistries, BNP, troponin, EKG, all of which were normal.  He returns now because the symptoms persist and because \"I did not get a lot of answers on Friday.\"  He has an appointment with his primary care physician tomorrow that he was referred to for follow-up for further workup of the symptoms.  I talked with his wife and she says that he does snore quite significantly.  He has never had a sleep study.  He does not smoke.  He rarely consumes alcohol.  He does not think he has any unusual stress.  There is some stressors at work and with his father but these have not really changed recently.  He feels like both of his legs are swollen and tight.  He does not have calf pain.  There is no history of DVT or PE.    Allergies:  Allergies   Allergen Reactions    Shellfish Allergy Itching and Shortness Of Breath    Pcn [Penicillins]     Sulfa Antibiotics Unknown     Patient reported on 8-18-18  his throat swells up when fireworks are being used. He doesn't recall being allergic to sulfa antibiotics, just sulfur from fireworks.       Problem List:    Patient Active Problem List    Diagnosis Date Noted    Hyperlipidemia LDL goal <160 12/28/2010     Priority: Medium        Past Medical History:    No past medical history on file.    Past Surgical History:    No past surgical history on file.    Family History:  " "  Family History   Problem Relation Age of Onset    Depression Father     Thyroid Disease Sister        Social History:  Marital Status:   [2]  Social History     Tobacco Use    Smoking status: Never     Passive exposure: Yes    Smokeless tobacco: Current     Types: Chew   Substance Use Topics    Alcohol use: No    Drug use: No        Medications:    meloxicam (MOBIC) 7.5 MG tablet  methylPREDNISolone (MEDROL DOSEPAK) 4 MG tablet therapy pack  NO ACTIVE MEDICATIONS      Review of Systems    All other systems are reviewed and are negative    Physical Exam   BP: 131/82  Pulse: 52  Temp: 97.9  F (36.6  C)  Height: 167.6 cm (5' 6\")  Weight: 117.9 kg (260 lb)  SpO2: 99 %      Physical Exam    Nursing note and vitals were reviewed.  Constitutional: Awake and alert, overweight appearing 38-year-old in no apparent discomfort, who does not appear acutely ill, who appears anxious and is overbreathing but who answers questions appropriately and cooperates with examination.  HEENT: Voice quality is normal.  Speech is fluent.  PERRL.  EOMI.   Neck: Freely mobile.  Cardiovascular: Cardiac examination reveals normal heart rate and regular rhythm without murmur.  Pulmonary/Chest: Breathing is unlabored.  Breath sounds are clear and equal bilaterally.  There no retractions, tachypnea, rales, wheezes, or rhonchi.  Abdomen: Soft, nontender, no HSM or masses rebound or guarding.  Musculoskeletal: Extremities are warm and well-perfused and without edema  Neurological: Alert, oriented, thought content logical, coherent   Skin: Warm, dry, no rashes.  Psychiatric: Affect broad and appropriate.    ED Course        Procedures              EKG Interpretation:      Interpreted by Ilan Limon MD  Time reviewed: 13:09  Symptoms at time of EKG: dyspnea   Rhythm: sinus   Rate: 51  Axis: normal  Ectopy: none  Conduction: normal  ST Segments/ T Waves: No ST-T wave changes  Q Waves: none  Comparison to prior: Unchanged from " 2/23/2024    Clinical Impression: normal EKG      Critical Care time:  none               Results for orders placed or performed during the hospital encounter of 02/28/24 (from the past 24 hour(s))   [place] [facility]    Narrative    The following orders were created for panel order [facility] [facility].  Procedure                               Abnormality         Status                     ---------                               -----------         ------                     Extra Blue Top Tube[[id number]]                              In process                 Extra Red Top Tube[[id number]]                               In process                 Extra Green Top (Lithium...[[id number]]                      In process                 Extra Purple Top Tube[[id number]]                            In process                   Please view results for these tests on the individual orders.   D dimer quantitative   Result Value Ref Range    D-Dimer Quantitative <0.27 0.00 - 0.50 ug/mL FEU    Narrative    This D-dimer assay is intended for use in conjunction with a clinical pretest probability assessment model to exclude pulmonary embolism (PE) and deep venous thrombosis (DVT) in outpatients suspected of PE or DVT. The cut-off value is 0.50 ug/mL FEU.   Troponin T, High Sensitivity   Result Value Ref Range    Troponin T, High Sensitivity <6 <=22 ng/L   TSH with free T4 reflex   Result Value Ref Range    TSH 2.43 0.30 - 4.20 uIU/mL       Medications - No data to display    Assessments & Plan (with Medical Decision Making)     38-year-old male came in with nonspecific symptoms of lightheadedness and chest tightness for which she was seen 5 days ago and had an extensive cardiopulmonary and stroke workup.  All his testing was normal.  He reports intermittent episodes of feeling lightheaded and tightness in his chest.  On my initial evaluation he was hyperventilating taking deep breaths and increasing his minute ventilation.  His wife reports  very significant snoring and apnea spells.  His workup was expanded and repeated today with a repeat EKG which remains normal, repeat troponin which remains undetectable and addition of thyroid testing which was normal and D-dimer testing to screen for PE which was also undetectable.  I reassured him that there is no evidence that this is due to stroke, brain tumor, heart failure, heart attack, pneumothorax, pulmonary embolism, or other worrisome acute process.  I suspect he has sleep apnea and this may be the cause for his symptoms.  He may be a component of anxiety superimposed on this due to worry about what his symptoms mean.  Certainly he is at high risk for sleep apnea.  He has an appointment with his primary care physician and will talk with him tomorrow about scheduling a sleep study and other tests he may recommend.    I have reviewed the nursing notes.    I have reviewed the findings, diagnosis, plan and need for follow up with the patient.         New Prescriptions    No medications on file       Final diagnoses:   Chest pressure   Dizziness       2/28/2024   [facility] EMERGENCY DEPT       Ilan Limon MD  02/28/24 5902

## 2024-02-28 NOTE — ED TRIAGE NOTES
Pt c/o cp, dizzy, and disorientated on/off since Friday.  Symptoms last for about 15-20 minutes at a time.  Pt here on Friday for same with no dx.     Triage Assessment (Adult)       Row Name 02/28/24 4554          Triage Assessment    Airway WDL WDL        Respiratory WDL    Respiratory WDL WDL        Cognitive/Neuro/Behavioral WDL    Cognitive/Neuro/Behavioral WDL WDL                     
(1) obesity (BMI greater than 25)

## 2025-03-05 ENCOUNTER — OFFICE VISIT (OUTPATIENT)
Dept: FAMILY MEDICINE | Facility: CLINIC | Age: 40
End: 2025-03-05
Payer: COMMERCIAL

## 2025-03-05 ENCOUNTER — ANCILLARY PROCEDURE (OUTPATIENT)
Dept: GENERAL RADIOLOGY | Facility: CLINIC | Age: 40
End: 2025-03-05
Attending: FAMILY MEDICINE
Payer: COMMERCIAL

## 2025-03-05 VITALS
SYSTOLIC BLOOD PRESSURE: 123 MMHG | HEIGHT: 66 IN | WEIGHT: 295.8 LBS | RESPIRATION RATE: 22 BRPM | TEMPERATURE: 98 F | HEART RATE: 76 BPM | OXYGEN SATURATION: 95 % | DIASTOLIC BLOOD PRESSURE: 79 MMHG | BODY MASS INDEX: 47.54 KG/M2

## 2025-03-05 DIAGNOSIS — M79.671 CHRONIC FOOT PAIN, RIGHT: Primary | ICD-10-CM

## 2025-03-05 DIAGNOSIS — G89.29 CHRONIC FOOT PAIN, RIGHT: Primary | ICD-10-CM

## 2025-03-05 LAB
ANION GAP SERPL CALCULATED.3IONS-SCNC: 12 MMOL/L (ref 7–15)
BASOPHILS # BLD AUTO: 0.1 10E3/UL (ref 0–0.2)
BASOPHILS NFR BLD AUTO: 1 %
BUN SERPL-MCNC: 18 MG/DL (ref 6–20)
CALCIUM SERPL-MCNC: 9.3 MG/DL (ref 8.8–10.4)
CHLORIDE SERPL-SCNC: 106 MMOL/L (ref 98–107)
CREAT SERPL-MCNC: 1.04 MG/DL (ref 0.67–1.17)
CRP SERPL-MCNC: 9.01 MG/L
EGFRCR SERPLBLD CKD-EPI 2021: >90 ML/MIN/1.73M2
EOSINOPHIL # BLD AUTO: 0.1 10E3/UL (ref 0–0.7)
EOSINOPHIL NFR BLD AUTO: 2 %
ERYTHROCYTE [DISTWIDTH] IN BLOOD BY AUTOMATED COUNT: 12.8 % (ref 10–15)
ERYTHROCYTE [SEDIMENTATION RATE] IN BLOOD BY WESTERGREN METHOD: 17 MM/HR (ref 0–15)
GLUCOSE SERPL-MCNC: 104 MG/DL (ref 70–99)
HCO3 SERPL-SCNC: 23 MMOL/L (ref 22–29)
HCT VFR BLD AUTO: 42.9 % (ref 40–53)
HGB BLD-MCNC: 14.6 G/DL (ref 13.3–17.7)
IMM GRANULOCYTES # BLD: 0 10E3/UL
IMM GRANULOCYTES NFR BLD: 0 %
LYMPHOCYTES # BLD AUTO: 2 10E3/UL (ref 0.8–5.3)
LYMPHOCYTES NFR BLD AUTO: 23 %
MCH RBC QN AUTO: 29.3 PG (ref 26.5–33)
MCHC RBC AUTO-ENTMCNC: 34 G/DL (ref 31.5–36.5)
MCV RBC AUTO: 86 FL (ref 78–100)
MONOCYTES # BLD AUTO: 0.7 10E3/UL (ref 0–1.3)
MONOCYTES NFR BLD AUTO: 8 %
NEUTROPHILS # BLD AUTO: 5.7 10E3/UL (ref 1.6–8.3)
NEUTROPHILS NFR BLD AUTO: 67 %
PLATELET # BLD AUTO: 227 10E3/UL (ref 150–450)
POTASSIUM SERPL-SCNC: 4.3 MMOL/L (ref 3.4–5.3)
RBC # BLD AUTO: 4.98 10E6/UL (ref 4.4–5.9)
SODIUM SERPL-SCNC: 141 MMOL/L (ref 135–145)
URATE SERPL-MCNC: 9.4 MG/DL (ref 3.4–7)
WBC # BLD AUTO: 8.5 10E3/UL (ref 4–11)

## 2025-03-05 PROCEDURE — 1125F AMNT PAIN NOTED PAIN PRSNT: CPT | Performed by: FAMILY MEDICINE

## 2025-03-05 PROCEDURE — 73630 X-RAY EXAM OF FOOT: CPT | Mod: TC | Performed by: STUDENT IN AN ORGANIZED HEALTH CARE EDUCATION/TRAINING PROGRAM

## 2025-03-05 PROCEDURE — 80048 BASIC METABOLIC PNL TOTAL CA: CPT | Performed by: FAMILY MEDICINE

## 2025-03-05 PROCEDURE — 84550 ASSAY OF BLOOD/URIC ACID: CPT | Performed by: FAMILY MEDICINE

## 2025-03-05 PROCEDURE — 3074F SYST BP LT 130 MM HG: CPT | Performed by: FAMILY MEDICINE

## 2025-03-05 PROCEDURE — 86140 C-REACTIVE PROTEIN: CPT | Performed by: FAMILY MEDICINE

## 2025-03-05 PROCEDURE — 36415 COLL VENOUS BLD VENIPUNCTURE: CPT | Performed by: FAMILY MEDICINE

## 2025-03-05 PROCEDURE — 85652 RBC SED RATE AUTOMATED: CPT | Performed by: FAMILY MEDICINE

## 2025-03-05 PROCEDURE — 85025 COMPLETE CBC W/AUTO DIFF WBC: CPT | Performed by: FAMILY MEDICINE

## 2025-03-05 PROCEDURE — 3078F DIAST BP <80 MM HG: CPT | Performed by: FAMILY MEDICINE

## 2025-03-05 PROCEDURE — 99204 OFFICE O/P NEW MOD 45 MIN: CPT | Performed by: FAMILY MEDICINE

## 2025-03-05 RX ORDER — OXYCODONE HYDROCHLORIDE 5 MG/1
5 TABLET ORAL EVERY 6 HOURS PRN
Qty: 12 TABLET | Refills: 0 | Status: SHIPPED | OUTPATIENT
Start: 2025-03-05 | End: 2025-03-08

## 2025-03-05 RX ORDER — PREDNISONE 20 MG/1
TABLET ORAL
Qty: 20 TABLET | Refills: 0 | Status: SHIPPED | OUTPATIENT
Start: 2025-03-05

## 2025-03-05 RX ORDER — NAPROXEN SODIUM 220 MG/1
220 TABLET, FILM COATED ORAL 2 TIMES DAILY PRN
COMMUNITY

## 2025-03-05 ASSESSMENT — PAIN SCALES - GENERAL: PAINLEVEL_OUTOF10: SEVERE PAIN (9)

## 2025-03-05 NOTE — PROGRESS NOTES
Assessment & Plan     Chronic foot pain, right  Based on pain flares around the proximity of the right foot only, still suspect gout.  May still consider other forms of inflammatory arthritis, rheum disorders, strain or repeated trauma.  Labs as below. XR to assess bones and joints.  Treat as gout first. Discussed use of prednisone.  Advised safe use of oxycodone.  May take naproxen for milder pain.  Consider allopurinol if elevated uric acid due to hx of frequent flares in last year.  Advised gout prevention. Additional literature given.  Refer to podiatry if with bony abnormalities or if with other concerns.  Ambulate as tolerated.  Return precautions discussed and given to patient.   - CBC with Platelets & Differential  - Basic metabolic panel  - CRP inflammation  - Erythrocyte sedimentation rate auto  - Uric acid  - predniSONE (DELTASONE) 20 MG tablet  Dispense: 20 tablet; Refill: 0  - oxyCODONE (ROXICODONE) 5 MG tablet  Dispense: 12 tablet; Refill: 0  - XR Foot Right G/E 3 Views        Patient Instructions   Take prednisone as prescribed.    Take oxycodone only as prescribed for moderate to severe pain.  Do not drive or consume alcohol while taking this medication.    May take Naproxen 220 mg orally with food every 12 hrs as needed for milder pain.     You will be contacted in 1-2 days for results of your lab tests.     Further recommendations thereafter.    Read more information about your conditions in the handouts attached to this visit summary.     Delgado Kowalski is a 39 year old, presenting for the following health issues:  Foot Pain (Right foot)        3/5/2025    11:06 AM   Additional Questions   Roomed by Andie MCDANIEL CMA     History of Present Illness       Reason for visit:  Right foot pain  Symptom onset:  More than a month  Symptoms include:  Pain is so severe, he can barely walk on it or sleep.  Symptom intensity:  Severe  Symptom progression:  Worsening  What makes it worse:  Walking  What  "makes it better:  Tylenol and Ibuprofen, but only slightly-feels he is taking way too much OTC pain medication to only slightly relieve pain         Told by podiatry in 2023 he has gout but patient said he was not given any med. Was told to just use an ortho boot until pain is gone.    Patient states pain can be on the ball of foot, heel or the side of foot.    Patient never knew he was prescribed NSAID + sterpid by podiatry in 2023.    Patient has tried to modify diet to prevent gout - no relief.  Rare alcohol consumption - usually wine cooler or Twisted Tea.    Denies otc supplements.  Denies having any prescribed meds.    Denies known trauma to foot.      Review of Systems  Constitutional, HEENT, cardiovascular, pulmonary, GI, , musculoskeletal, neuro, skin, endocrine and psych systems are negative, except as otherwise noted.      Objective    /79 (BP Location: Right arm, Patient Position: Sitting, Cuff Size: Adult Large)   Pulse 76   Temp 98  F (36.7  C) (Tympanic)   Resp 22   Ht 1.676 m (5' 6\")   Wt 134.2 kg (295 lb 12.8 oz)   SpO2 95%   BMI 47.74 kg/m    Body mass index is 47.74 kg/m .  Physical Exam   GEN: alert, oriented x 3, ambulatory w/o assist but with antalgic gait  RIGHT FOOT ANKLE: no deformity/discoloration; trace generalized swelling but no induration; moderate-severe TTP along latera extensor area of the midfoot and ankle; limited inversion and eversion range of motion due to pain; pain also on plantarflexion    No results found for any visits on 03/05/25.        Signed Electronically by: Johnathan Ruano MD    "

## 2025-03-05 NOTE — PATIENT INSTRUCTIONS
Take prednisone as prescribed.    Take oxycodone only as prescribed for moderate to severe pain.  Do not drive or consume alcohol while taking this medication.    May take Naproxen 220 mg orally with food every 12 hrs as needed for milder pain.     You will be contacted in 1-2 days for results of your lab tests.     Further recommendations thereafter.    Read more information about your conditions in the handouts attached to this visit summary.

## 2025-05-18 ENCOUNTER — HOSPITAL ENCOUNTER (EMERGENCY)
Facility: CLINIC | Age: 40
Discharge: HOME | End: 2025-05-18
Attending: EMERGENCY MEDICINE | Admitting: EMERGENCY MEDICINE
Payer: COMMERCIAL

## 2025-05-18 ENCOUNTER — APPOINTMENT (OUTPATIENT)
Dept: GENERAL RADIOLOGY | Facility: CLINIC | Age: 40
End: 2025-05-18
Attending: EMERGENCY MEDICINE
Payer: COMMERCIAL

## 2025-05-18 VITALS
HEART RATE: 62 BPM | DIASTOLIC BLOOD PRESSURE: 89 MMHG | OXYGEN SATURATION: 95 % | TEMPERATURE: 98.7 F | RESPIRATION RATE: 18 BRPM | BODY MASS INDEX: 49.68 KG/M2 | WEIGHT: 307.8 LBS | SYSTOLIC BLOOD PRESSURE: 162 MMHG

## 2025-05-18 DIAGNOSIS — M25.562 ACUTE PAIN OF LEFT KNEE: ICD-10-CM

## 2025-05-18 PROCEDURE — 99283 EMERGENCY DEPT VISIT LOW MDM: CPT | Performed by: EMERGENCY MEDICINE

## 2025-05-18 PROCEDURE — 73562 X-RAY EXAM OF KNEE 3: CPT | Mod: LT

## 2025-05-18 ASSESSMENT — ACTIVITIES OF DAILY LIVING (ADL)
ADLS_ACUITY_SCORE: 41

## 2025-05-18 NOTE — ED TRIAGE NOTES
Arrives from home concerned for left knee pain, denies any significant injury but does recall twisting wrong & feeling a twinge of pain at work about 1.5 weeks ago. No previous surgeries to knee, states some old injuries from sports.      Triage Assessment (Adult)       Row Name 05/18/25 1441          Triage Assessment    Airway WDL WDL        Respiratory WDL    Respiratory WDL WDL        Skin Circulation/Temperature WDL    Skin Circulation/Temperature WDL WDL        Cardiac WDL    Cardiac WDL WDL        Peripheral/Neurovascular WDL    Peripheral Neurovascular WDL WDL        Cognitive/Neuro/Behavioral WDL    Cognitive/Neuro/Behavioral WDL WDL

## 2025-05-18 NOTE — ED PROVIDER NOTES
HPI  Chief Complaint   Patient presents with    Knee Pain     Left       HPI  Dex Gee is a 39 year old male with history several weeks of left knee pain.  Felt a sharp tearing pain when he stepped out of a truck and said that he came down in unusual way.  Does not remember exactly what happened.  Pain was immediate.  Pain worsens with standing and movement at the knee.  Had been tolerable and managed with OTC pain control.  Was very active for work past several days and now has significant worsening of pain.  No fevers.  No redness.      Allergies:  Allergies   Allergen Reactions    Shellfish Allergy Itching and Shortness Of Breath    Pcn [Penicillins]     Sulfa Antibiotics Unknown     Patient reported on 8-18-18  his throat swells up when fireworks are being used. He doesn't recall being allergic to sulfa antibiotics, just sulfur from fireworks.       Problem List:    Patient Active Problem List    Diagnosis Date Noted    Hyperlipidemia LDL goal <160 12/28/2010     Priority: Medium        Past Medical History:    No past medical history on file.    Past Surgical History:    No past surgical history on file.    Family History:    Family History   Problem Relation Age of Onset    Depression Father     Thyroid Disease Sister        Social History:  Marital Status:   [2]  Social History     Tobacco Use    Smoking status: Never     Passive exposure: Yes    Smokeless tobacco: Current     Types: Chew   Vaping Use    Vaping status: Never Used   Substance Use Topics    Alcohol use: No    Drug use: No        Medications:    Acetaminophen (TYLENOL PO)  Ascorbic Acid (VITAMIN C PO)  Multiple Vitamin (MULTIVITAMIN PO)  naproxen sodium (ANAPROX) 220 MG tablet  NO ACTIVE MEDICATIONS          Review of Systems  Pertinent positives and negatives mentioned in HPI    Physical Exam   BP: (!) 162/89  Pulse: 62  Temp: 98.7  F (37.1  C)  Resp: 18  Weight: (!) 139.6 kg (307 lb 12.8 oz)  SpO2: 95 %    GEN: Awake, alert,  and cooperative.   CV : Extremities warm and well perfused.  PULM: Normal effort. Speaking in full sentences.  NEURO: Normal speech. Following commands.  Answering questions and interacting appropriately.   EXT: No gross deformity. No erythema or calor. Tenderness of lateral knee. Pain with varus stress. No joint line or anterior tenderness.   INT: Warm. No diaphoresis. Normal color.     ED Course        Procedures                 Critical Care time:  none              Results for orders placed or performed during the hospital encounter of 05/18/25 (from the past 24 hours)   XR Knee Left 3 Views    Narrative    EXAM: XR KNEE LEFT 3 VIEWS  LOCATION: [facility]  DATE: 5/18/2025    INDICATION: lateral knee pain, concern for LCL injury, please eval for acute bony abnormality  COMPARISON: None.      Impression    IMPRESSION: Normal joint spaces and alignment. No fracture or joint effusion.       Medications - No data to display    Assessments & Plan (with Medical Decision Making)   39 year old male with past medical history with left lateral knee pain worsening over the past few weeks.  No signs of septic arthritis on exam.  No fevers.  No reported trauma.  Does have lateral knee tenderness and significant discomfort with varus stress.  Concern for LCL or lateral meniscal injury.  Mild effusion on exam.  Plain films with no acute bony abnormality and small joint effusion.  Plan for rest, ice, compression, elevation.  Crutches and knee immobilizer given.  Orthopedic  referral placed.  Home care, follow-up, ED return precautions discussed.         I have reviewed the nursing notes.         New Prescriptions    No medications on file       Final diagnoses:   Acute pain of left knee - concern for LCL injury     Acosta Palafox MD        5/18/2025   [facility] EMERGENCY DEPT    Disclaimer: This note consists of words and symbols derived from keyboarding and dictation  using voice recognition software.  As a result, there may be errors that have gone undetected.  Please consider this when interpreting information found in this note.               Acosta Palafox MD  05/18/25 1745

## 2025-05-18 NOTE — DISCHARGE INSTRUCTIONS
1. Rest and Protection:   Limit activities that exacerbate pain or swelling.   Use crutches if weight-bearing is painful.    2. Ice:   Apply ice packs to the affected area for 15-20 minutes every 2-3 hours during the first 48 hours to reduce swelling and pain.    3. Compression:   Use an elastic bandage or compression wrap to help control swelling. Ensure it is snug but not too tight to avoid restricting blood flow.    4. Elevation:   Elevate the injured leg above heart level as much as possible to reduce swelling.    5. Pain Management:   Over-the-counter pain relievers such as acetaminophen or NSAIDs (e.g., ibuprofen) can be used to manage pain and inflammation. Follow dosing instructions on the package.    6. Immobilization:   Depending on the severity of the injury, immobilization with a brace or splint may be recommended. Severe injuries may require semi-rigid bracing or below-knee casting for up to 10 days.    7. Weight-Bearing:   Gradually increase weight-bearing as tolerated. Use external supports like braces or taping based on the severity of the injury and patient preference.    8. Follow-Up:   Schedule a follow-up appointment with an orthopedic specialist or primary care physician within one week to reassess the injury and adjust the treatment plan as necessary.    9. Rehabilitation:   Begin gentle range-of-motion exercises as soon as pain allows. Progress to strengthening and proprioceptive exercises under the guidance of a physical therapist to restore function and prevent future injuries.    Warning Signs:   Seek immediate medical attention if you experience increased pain, swelling, redness, or warmth around the knee, or if you develop a fever.    Use Crutches until cleared by orthopedics

## 2025-06-06 ENCOUNTER — HOSPITAL ENCOUNTER (OUTPATIENT)
Dept: MRI IMAGING | Facility: CLINIC | Age: 40
Discharge: HOME | End: 2025-06-06
Attending: PEDIATRICS | Admitting: PEDIATRICS
Payer: COMMERCIAL

## 2025-06-06 DIAGNOSIS — M25.562 ACUTE PAIN OF LEFT KNEE: ICD-10-CM

## 2025-06-06 PROCEDURE — 73721 MRI JNT OF LWR EXTRE W/O DYE: CPT | Mod: LT

## 2025-06-06 PROCEDURE — 73721 MRI JNT OF LWR EXTRE W/O DYE: CPT | Mod: 26 | Performed by: RADIOLOGY

## 2025-06-09 ENCOUNTER — TELEPHONE (OUTPATIENT)
Dept: ORTHOPEDICS | Facility: CLINIC | Age: 40
End: 2025-06-09
Payer: COMMERCIAL

## 2025-06-09 NOTE — TELEPHONE ENCOUNTER
Patient called the clinic hoping to get the results of his MRI.   RN read the impression to patient but notified him that his provider would need to interpret the full findings and view the image as necessary. Provider would need to notify patient of what the full result and plan is.     Patient has some relief knowing at least the impression at this time.   Please notify patient of the findings.     Geetha Quintero RN on 6/9/2025 at 5:33 PM

## 2025-06-10 ENCOUNTER — RESULTS FOLLOW-UP (OUTPATIENT)
Dept: ORTHOPEDICS | Facility: CLINIC | Age: 40
End: 2025-06-10

## 2025-06-10 NOTE — TELEPHONE ENCOUNTER
Called; LVM to schedule patient a follow up visit to go over MRI results with one of Dr. Holden colleagues.     Left c/b number.     MADDIE Cortez